# Patient Record
Sex: FEMALE | Race: OTHER
[De-identification: names, ages, dates, MRNs, and addresses within clinical notes are randomized per-mention and may not be internally consistent; named-entity substitution may affect disease eponyms.]

---

## 2017-09-05 ENCOUNTER — HOSPITAL ENCOUNTER (EMERGENCY)
Dept: HOSPITAL 15 - ER | Age: 62
Discharge: HOME | End: 2017-09-05
Payer: COMMERCIAL

## 2017-09-05 VITALS — WEIGHT: 145 LBS | BODY MASS INDEX: 22.76 KG/M2 | HEIGHT: 67 IN

## 2017-09-05 VITALS — SYSTOLIC BLOOD PRESSURE: 156 MMHG | DIASTOLIC BLOOD PRESSURE: 84 MMHG
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
## 2017-09-05 DIAGNOSIS — Z96.641: ICD-10-CM
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
## 2017-09-05 DIAGNOSIS — S73.004A: Primary | ICD-10-CM

## 2017-09-05 DIAGNOSIS — Z90.710: ICD-10-CM

## 2017-09-05 DIAGNOSIS — Z90.49: ICD-10-CM

## 2017-09-05 LAB
ALBUMIN SERPL-MCNC: 3.8 G/DL (ref 3.4–5)
ALP SERPL-CCNC: 85 U/L (ref 45–117)
ANION GAP SERPL CALCULATED.3IONS-SCNC: 11 MMOL/L (ref 5–15)
APTT PPP: 28.9 SEC (ref 22.64–33.71)
BILIRUB SERPL-MCNC: 0.3 MG/DL (ref 0.2–1)
BUN SERPL-MCNC: 11 MG/DL (ref 7–18)
BUN/CREAT SERPL: 14.1
CALCIUM SERPL-MCNC: 9.3 MG/DL (ref 8.5–10.1)
CHLORIDE SERPL-SCNC: 96 MMOL/L (ref 98–107)
CO2 SERPL-SCNC: 26 MMOL/L (ref 21–32)
DEFINITIVE: (no result)
GLUCOSE SERPL-MCNC: 104 MG/DL (ref 74–106)
HCT VFR BLD AUTO: 34.1 % (ref 36–46)
HGB BLD-MCNC: 10.9 G/DL (ref 12.2–16.2)
INR PPP: 0.97 (ref 0.9–1.15)
MCH RBC QN AUTO: 27.7 PG (ref 28–32)
MCV RBC AUTO: 86.7 FL (ref 80–100)
NRBC BLD QL AUTO: 0 %
POTASSIUM SERPL-SCNC: 3.7 MMOL/L (ref 3.5–5.1)
PROT SERPL-MCNC: 7.2 G/DL (ref 6.4–8.2)
PROTHROMBIN TIME: 10.6 SEC (ref 9.37–12.3)
SODIUM SERPL-SCNC: 133 MMOL/L (ref 136–145)

## 2017-09-05 PROCEDURE — 99152 MOD SED SAME PHYS/QHP 5/>YRS: CPT

## 2017-09-05 PROCEDURE — 99285 EMERGENCY DEPT VISIT HI MDM: CPT

## 2017-09-05 PROCEDURE — 96374 THER/PROPH/DIAG INJ IV PUSH: CPT

## 2017-09-05 PROCEDURE — 85610 PROTHROMBIN TIME: CPT

## 2017-09-05 PROCEDURE — 73501 X-RAY EXAM HIP UNI 1 VIEW: CPT

## 2017-09-05 PROCEDURE — 96375 TX/PRO/DX INJ NEW DRUG ADDON: CPT

## 2017-09-05 PROCEDURE — 36415 COLL VENOUS BLD VENIPUNCTURE: CPT

## 2017-09-05 PROCEDURE — 85730 THROMBOPLASTIN TIME PARTIAL: CPT

## 2017-09-05 PROCEDURE — 80053 COMPREHEN METABOLIC PANEL: CPT

## 2017-09-05 PROCEDURE — 85025 COMPLETE CBC W/AUTO DIFF WBC: CPT

## 2017-09-05 PROCEDURE — 27265 TREAT HIP DISLOCATION: CPT

## 2017-09-05 PROCEDURE — 94761 N-INVAS EAR/PLS OXIMETRY MLT: CPT

## 2017-09-05 PROCEDURE — 73502 X-RAY EXAM HIP UNI 2-3 VIEWS: CPT

## 2017-09-05 PROCEDURE — 96361 HYDRATE IV INFUSION ADD-ON: CPT

## 2017-09-09 ENCOUNTER — HOSPITAL ENCOUNTER (EMERGENCY)
Dept: HOSPITAL 15 - ER | Age: 62
Discharge: HOME | End: 2017-09-09
Payer: COMMERCIAL

## 2017-09-09 VITALS — SYSTOLIC BLOOD PRESSURE: 106 MMHG | DIASTOLIC BLOOD PRESSURE: 79 MMHG

## 2017-09-09 VITALS — WEIGHT: 145 LBS | BODY MASS INDEX: 21.98 KG/M2 | HEIGHT: 68 IN

## 2017-09-09 DIAGNOSIS — I10: ICD-10-CM

## 2017-09-09 DIAGNOSIS — G89.29: Primary | ICD-10-CM

## 2017-09-09 DIAGNOSIS — E07.89: ICD-10-CM

## 2017-09-09 DIAGNOSIS — M25.551: ICD-10-CM

## 2017-09-09 DIAGNOSIS — M54.5: ICD-10-CM

## 2017-09-09 DIAGNOSIS — M19.90: ICD-10-CM

## 2017-09-09 PROCEDURE — 73502 X-RAY EXAM HIP UNI 2-3 VIEWS: CPT

## 2017-09-09 PROCEDURE — 96372 THER/PROPH/DIAG INJ SC/IM: CPT

## 2017-09-09 PROCEDURE — 99284 EMERGENCY DEPT VISIT MOD MDM: CPT

## 2017-09-30 ENCOUNTER — HOSPITAL ENCOUNTER (EMERGENCY)
Dept: HOSPITAL 15 - ER | Age: 62
Discharge: HOME | End: 2017-09-30
Payer: COMMERCIAL

## 2017-09-30 VITALS — WEIGHT: 144 LBS | BODY MASS INDEX: 22.6 KG/M2 | HEIGHT: 67 IN

## 2017-09-30 VITALS — SYSTOLIC BLOOD PRESSURE: 144 MMHG | DIASTOLIC BLOOD PRESSURE: 77 MMHG
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
## 2017-09-30 DIAGNOSIS — Z90.710: ICD-10-CM

## 2017-09-30 DIAGNOSIS — Z90.89: ICD-10-CM
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
## 2017-09-30 DIAGNOSIS — S20.219A: ICD-10-CM

## 2017-09-30 DIAGNOSIS — E07.89: ICD-10-CM

## 2017-09-30 DIAGNOSIS — Z88.8: ICD-10-CM

## 2017-09-30 DIAGNOSIS — S76.011A: Primary | ICD-10-CM
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
## 2017-09-30 DIAGNOSIS — G89.29: ICD-10-CM

## 2017-09-30 PROCEDURE — 96372 THER/PROPH/DIAG INJ SC/IM: CPT

## 2017-09-30 PROCEDURE — 71020: CPT

## 2017-09-30 PROCEDURE — 73502 X-RAY EXAM HIP UNI 2-3 VIEWS: CPT

## 2017-09-30 PROCEDURE — 99284 EMERGENCY DEPT VISIT MOD MDM: CPT

## 2017-09-30 PROCEDURE — 73130 X-RAY EXAM OF HAND: CPT

## 2017-12-30 ENCOUNTER — HOSPITAL ENCOUNTER (EMERGENCY)
Dept: HOSPITAL 15 - ER | Age: 62
LOS: 1 days | Discharge: ANOTHER HOSPITAL | End: 2017-12-31
Payer: COMMERCIAL

## 2017-12-30 VITALS — HEIGHT: 63 IN | WEIGHT: 140 LBS | BODY MASS INDEX: 24.8 KG/M2

## 2017-12-30 DIAGNOSIS — E07.89: ICD-10-CM

## 2017-12-30 DIAGNOSIS — M24.451: ICD-10-CM
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
## 2017-12-30 DIAGNOSIS — S73.004A: Primary | ICD-10-CM
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
## 2017-12-30 DIAGNOSIS — Z88.8: ICD-10-CM
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
## 2017-12-30 DIAGNOSIS — Z90.710: ICD-10-CM

## 2017-12-30 DIAGNOSIS — Z90.89: ICD-10-CM

## 2017-12-30 PROCEDURE — 99285 EMERGENCY DEPT VISIT HI MDM: CPT

## 2017-12-30 PROCEDURE — 99152 MOD SED SAME PHYS/QHP 5/>YRS: CPT

## 2017-12-30 PROCEDURE — 96374 THER/PROPH/DIAG INJ IV PUSH: CPT

## 2017-12-30 PROCEDURE — 96376 TX/PRO/DX INJ SAME DRUG ADON: CPT

## 2017-12-30 PROCEDURE — 73501 X-RAY EXAM HIP UNI 1 VIEW: CPT

## 2017-12-30 PROCEDURE — 96375 TX/PRO/DX INJ NEW DRUG ADDON: CPT

## 2017-12-30 PROCEDURE — 27250 TREAT HIP DISLOCATION: CPT

## 2017-12-30 PROCEDURE — 73502 X-RAY EXAM HIP UNI 2-3 VIEWS: CPT

## 2017-12-31 VITALS — DIASTOLIC BLOOD PRESSURE: 47 MMHG | SYSTOLIC BLOOD PRESSURE: 108 MMHG

## 2018-12-19 ENCOUNTER — HOSPITAL ENCOUNTER (OUTPATIENT)
Dept: HOSPITAL 15 - ER | Age: 63
Discharge: ANOTHER HOSPITAL | End: 2018-12-19
Attending: ORTHOPAEDIC SURGERY
Payer: COMMERCIAL

## 2018-12-19 VITALS — BODY MASS INDEX: 25.9 KG/M2 | WEIGHT: 165 LBS | HEIGHT: 67 IN

## 2018-12-19 VITALS — SYSTOLIC BLOOD PRESSURE: 131 MMHG | DIASTOLIC BLOOD PRESSURE: 97 MMHG
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
## 2018-12-19 DIAGNOSIS — I10: ICD-10-CM

## 2018-12-19 DIAGNOSIS — Z96.641: ICD-10-CM
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
## 2018-12-19 DIAGNOSIS — E03.9: ICD-10-CM

## 2018-12-19 DIAGNOSIS — T84.020A: Primary | ICD-10-CM

## 2018-12-19 DIAGNOSIS — M19.90: ICD-10-CM

## 2018-12-19 LAB
ALBUMIN SERPL-MCNC: 3.4 G/DL (ref 3.4–5)
ALP SERPL-CCNC: 83 U/L (ref 45–117)
ALT SERPL-CCNC: 30 U/L (ref 13–56)
ANION GAP SERPL CALCULATED.3IONS-SCNC: 3 MMOL/L (ref 5–15)
BILIRUB SERPL-MCNC: 0.3 MG/DL (ref 0.2–1)
BUN SERPL-MCNC: 13 MG/DL (ref 7–18)
BUN/CREAT SERPL: 14.1
CALCIUM SERPL-MCNC: 8.3 MG/DL (ref 8.5–10.1)
CHLORIDE SERPL-SCNC: 106 MMOL/L (ref 98–107)
CO2 SERPL-SCNC: 27 MMOL/L (ref 21–32)
GLUCOSE SERPL-MCNC: 111 MG/DL (ref 74–106)
HCT VFR BLD AUTO: 36.1 % (ref 36–46)
HGB BLD-MCNC: 11.2 G/DL (ref 12.2–16.2)
MAGNESIUM SERPL-MCNC: 2.7 MG/DL (ref 1.6–2.6)
MCH RBC QN AUTO: 27 PG (ref 28–32)
MCV RBC AUTO: 87.2 FL (ref 80–100)
NRBC BLD QL AUTO: 0.1 %
POTASSIUM SERPL-SCNC: 3.9 MMOL/L (ref 3.5–5.1)
PROT SERPL-MCNC: 6.8 G/DL (ref 6.4–8.2)
SODIUM SERPL-SCNC: 136 MMOL/L (ref 136–145)

## 2018-12-19 PROCEDURE — 93005 ELECTROCARDIOGRAM TRACING: CPT

## 2018-12-19 PROCEDURE — 76000 FLUOROSCOPY <1 HR PHYS/QHP: CPT

## 2018-12-19 PROCEDURE — 27266 TREAT HIP DISLOCATION: CPT

## 2018-12-19 PROCEDURE — 73501 X-RAY EXAM HIP UNI 1 VIEW: CPT

## 2018-12-19 PROCEDURE — 80053 COMPREHEN METABOLIC PANEL: CPT

## 2018-12-19 PROCEDURE — 83735 ASSAY OF MAGNESIUM: CPT

## 2018-12-19 PROCEDURE — 71045 X-RAY EXAM CHEST 1 VIEW: CPT

## 2018-12-19 PROCEDURE — 36415 COLL VENOUS BLD VENIPUNCTURE: CPT

## 2018-12-19 PROCEDURE — 85025 COMPLETE CBC W/AUTO DIFF WBC: CPT

## 2018-12-19 RX ADMIN — HYDROMORPHONE HYDROCHLORIDE PRN MG: 2 INJECTION, SOLUTION INTRAMUSCULAR; INTRAVENOUS; SUBCUTANEOUS at 15:15

## 2018-12-19 RX ADMIN — HYDROMORPHONE HYDROCHLORIDE PRN MG: 2 INJECTION, SOLUTION INTRAMUSCULAR; INTRAVENOUS; SUBCUTANEOUS at 15:01

## 2018-12-19 RX ADMIN — HYDROMORPHONE HYDROCHLORIDE PRN MG: 2 INJECTION, SOLUTION INTRAMUSCULAR; INTRAVENOUS; SUBCUTANEOUS at 14:50

## 2018-12-19 RX ADMIN — HYDROMORPHONE HYDROCHLORIDE PRN MG: 2 INJECTION, SOLUTION INTRAMUSCULAR; INTRAVENOUS; SUBCUTANEOUS at 14:34

## 2020-05-29 ENCOUNTER — HOSPITAL ENCOUNTER (INPATIENT)
Dept: HOSPITAL 4 - SMU | Age: 65
LOS: 1 days | Discharge: HOME | DRG: 458 | End: 2020-05-30
Attending: NEUROLOGICAL SURGERY | Admitting: NEUROLOGICAL SURGERY
Payer: COMMERCIAL

## 2020-05-29 VITALS — SYSTOLIC BLOOD PRESSURE: 106 MMHG

## 2020-05-29 VITALS — SYSTOLIC BLOOD PRESSURE: 150 MMHG

## 2020-05-29 VITALS — WEIGHT: 167 LBS | HEIGHT: 67 IN | BODY MASS INDEX: 26.21 KG/M2

## 2020-05-29 DIAGNOSIS — Z03.818: ICD-10-CM

## 2020-05-29 DIAGNOSIS — M51.16: Primary | ICD-10-CM

## 2020-05-29 DIAGNOSIS — M41.50: ICD-10-CM

## 2020-05-29 DIAGNOSIS — G89.29: ICD-10-CM

## 2020-05-29 PROCEDURE — C9399 UNCLASSIFIED DRUGS OR BIOLOG: HCPCS

## 2020-05-29 PROCEDURE — 0ST40ZZ RESECTION OF LUMBOSACRAL DISC, OPEN APPROACH: ICD-10-PCS | Performed by: NEUROLOGICAL SURGERY

## 2020-05-29 PROCEDURE — 4A11X4G MONITORING OF PERIPHERAL NERVOUS ELECTRICAL ACTIVITY, INTRAOPERATIVE, EXTERNAL APPROACH: ICD-10-PCS | Performed by: NEUROLOGICAL SURGERY

## 2020-05-29 PROCEDURE — 01NB0ZZ RELEASE LUMBAR NERVE, OPEN APPROACH: ICD-10-PCS | Performed by: NEUROLOGICAL SURGERY

## 2020-05-29 PROCEDURE — 0ST20ZZ RESECTION OF LUMBAR VERTEBRAL DISC, OPEN APPROACH: ICD-10-PCS | Performed by: NEUROLOGICAL SURGERY

## 2020-05-29 PROCEDURE — 0SG10A0 FUSION OF 2 OR MORE LUMBAR VERTEBRAL JOINTS WITH INTERBODY FUSION DEVICE, ANTERIOR APPROACH, ANTERIOR COLUMN, OPEN APPROACH: ICD-10-PCS | Performed by: NEUROLOGICAL SURGERY

## 2020-05-29 PROCEDURE — 0SG30A0 FUSION OF LUMBOSACRAL JOINT WITH INTERBODY FUSION DEVICE, ANTERIOR APPROACH, ANTERIOR COLUMN, OPEN APPROACH: ICD-10-PCS | Performed by: NEUROLOGICAL SURGERY

## 2020-05-29 RX ADMIN — HYDROMORPHONE HYDROCHLORIDE PRN MG: 2 INJECTION INTRAMUSCULAR; INTRAVENOUS; SUBCUTANEOUS at 19:00

## 2020-05-29 RX ADMIN — SODIUM CHLORIDE SCH MLS/HR: 9 INJECTION, SOLUTION INTRAVENOUS at 22:40

## 2020-05-29 RX ADMIN — CEFAZOLIN SODIUM SCH MLS/HR: 2 SOLUTION INTRAVENOUS at 22:35

## 2020-05-29 RX ADMIN — HYDROMORPHONE HYDROCHLORIDE PRN MG: 2 INJECTION INTRAMUSCULAR; INTRAVENOUS; SUBCUTANEOUS at 16:01

## 2020-05-29 RX ADMIN — HYDROMORPHONE HYDROCHLORIDE PRN MG: 2 INJECTION INTRAMUSCULAR; INTRAVENOUS; SUBCUTANEOUS at 22:18

## 2020-05-29 RX ADMIN — SODIUM CHLORIDE SCH: 9 INJECTION, SOLUTION INTRAVENOUS at 13:25

## 2020-05-29 RX ADMIN — DOCUSATE SODIUM SCH MG: 100 CAPSULE, LIQUID FILLED ORAL at 21:00

## 2020-05-29 NOTE — NUR
Received patient from OR, she is awake and alert and in stable condition. She was given 
dilaudid and fentanyl in the OR. She is not in any kind of distress or pain at this time. I 
got report from OR nurse. Bed is low, locked, 2 side rails up and call light is within 
reach. Ciarra RHODES

## 2020-05-29 NOTE — NUR
Closing notes: Patient is stable and doing well, ambulates with a steady gait and without 
assistance. Her L hand IV infiltrated, it was removed and a new one started on left arm 
#22g, currently infusing NS. She is breathing well on RA. She presents no new complaints and 
ask for Dilaudid every 3 hrs for pain control. I will endorse to night shift nurse.  Bed is 
low, locked, 2 side rails up and call light is within reach. Ciarra RHODES

## 2020-05-29 NOTE — NUR
Patient is doing well, is able to ambulate without assistance and she has a steady gait. She 
is not in any distress and presents no new complaints. Bed is low, locked, 2 side rails up 
and call light is within reach. Ciarra RHODES

## 2020-05-29 NOTE — NUR
IV restart



IV came out from R. AC, catheter tip intact, no bleeding noted.  IV restarted R. FA 22G via 
aseptic technique, flushes well w/ NS.  Pt tolerated well.  Pt medicated w/ Dilaudid 2mg IVP 
as needed for pain.  To monitor.

## 2020-05-29 NOTE — NUR
Opening notes



Pt AAOx4, ambulated in the hallways.  VSS, afebrile.  No s/s distress noted.  Abd dressing 
C/D/I.  Call light within reach.  Safety maintained.  To monitor.

## 2020-05-30 VITALS — SYSTOLIC BLOOD PRESSURE: 99 MMHG

## 2020-05-30 VITALS — SYSTOLIC BLOOD PRESSURE: 119 MMHG

## 2020-05-30 VITALS — SYSTOLIC BLOOD PRESSURE: 120 MMHG

## 2020-05-30 LAB
ANION GAP SERPL CALCULATED.3IONS-SCNC: 15 MMOL/L (ref 5–15)
BASOPHILS # BLD AUTO: 0 K/UL (ref 0–0.2)
BASOPHILS NFR BLD AUTO: 0.2 % (ref 0–2)
BUN SERPL-MCNC: 18 MG/DL (ref 8–21)
CALCIUM SERPL-MCNC: 8.6 MG/DL (ref 8.4–11)
CHLORIDE SERPL-SCNC: 94 MMOL/L (ref 98–107)
CREAT SERPL-MCNC: 1.75 MG/DL (ref 0.55–1.3)
EOSINOPHIL # BLD AUTO: 0 K/UL (ref 0–0.4)
EOSINOPHIL NFR BLD AUTO: 0 % (ref 0–4)
ERYTHROCYTE [DISTWIDTH] IN BLOOD BY AUTOMATED COUNT: 18 % (ref 9–15)
GFR SERPL CREATININE-BSD FRML MDRD: 38 ML/MIN (ref 90–?)
GLUCOSE SERPL-MCNC: 130 MG/DL (ref 70–99)
HCT VFR BLD AUTO: 30.3 % (ref 36–48)
HGB BLD-MCNC: 9.7 G/DL (ref 12–16)
LYMPHOCYTES # BLD AUTO: 1.4 K/UL (ref 1–5.5)
LYMPHOCYTES NFR BLD AUTO: 13.2 % (ref 20.5–51.5)
MCH RBC QN AUTO: 28 PG (ref 27–31)
MCHC RBC AUTO-ENTMCNC: 32 % (ref 32–36)
MCV RBC AUTO: 86 FL (ref 79–98)
MONOCYTES # BLD MANUAL: 0.7 K/UL (ref 0–1)
MONOCYTES # BLD MANUAL: 6.9 % (ref 1.7–9.3)
NEUTROPHILS # BLD AUTO: 8.6 K/UL (ref 1.8–7.7)
NEUTROPHILS NFR BLD AUTO: 79.7 % (ref 40–70)
PLATELET # BLD AUTO: 340 K/UL (ref 130–430)
POTASSIUM SERPL-SCNC: 4.7 MMOL/L (ref 3.5–5.1)
RBC # BLD AUTO: 3.54 MIL/UL (ref 4.2–6.2)
SODIUM SERPLBLD-SCNC: 128 MMOL/L (ref 136–145)
WBC # BLD AUTO: 10.8 K/UL (ref 4.8–10.8)

## 2020-05-30 RX ADMIN — HYDROMORPHONE HYDROCHLORIDE PRN MG: 2 INJECTION INTRAMUSCULAR; INTRAVENOUS; SUBCUTANEOUS at 02:01

## 2020-05-30 RX ADMIN — CEFAZOLIN SODIUM SCH MLS/HR: 2 SOLUTION INTRAVENOUS at 05:22

## 2020-05-30 RX ADMIN — HYDROMORPHONE HYDROCHLORIDE PRN MG: 2 INJECTION INTRAMUSCULAR; INTRAVENOUS; SUBCUTANEOUS at 05:23

## 2020-05-30 RX ADMIN — HYDROMORPHONE HYDROCHLORIDE PRN MG: 2 INJECTION INTRAMUSCULAR; INTRAVENOUS; SUBCUTANEOUS at 08:17

## 2020-05-30 RX ADMIN — DOCUSATE SODIUM SCH MG: 100 CAPSULE, LIQUID FILLED ORAL at 08:21

## 2020-05-30 NOTE — NUR
Discharge home, ambulate with steady gait. Pain is controlled at this time. Discharge 
instruction given and discussed with patient. pt verbalize and understanding. arm band and 
ivl removed. Will monitor.

-------------------------------------------------------------------------------

Addendum: 05/30/20 at 1011 by Maritza Rodgers RN

-------------------------------------------------------------------------------

discharge

## 2020-05-30 NOTE — NUR
Closing notes/Pain 



Pt c/o severe pain, medicated with Dilaudid 2mg IVP.  Abd dressing C/D/I.  IVF/Antibiotic 
infusing at ordered rate no s/s infiltration.  CAll light within reach.  Safety maintained.  
To monitor.

## 2020-05-30 NOTE — NUR
Case mgt: Rec'd order for lumbar sacral brace--I called nurse Pablo to have house 
supervisor check materials department for brace-call me if there isn't one available in our 
stock supply. ER might have one also. MADELYN RHODES

## 2020-05-31 ENCOUNTER — HOSPITAL ENCOUNTER (INPATIENT)
Dept: HOSPITAL 4 - SED | Age: 65
LOS: 2 days | Discharge: HOME | DRG: 641 | End: 2020-06-02
Attending: NEUROLOGICAL SURGERY | Admitting: NEUROLOGICAL SURGERY
Payer: COMMERCIAL

## 2020-05-31 VITALS — SYSTOLIC BLOOD PRESSURE: 147 MMHG

## 2020-05-31 VITALS — SYSTOLIC BLOOD PRESSURE: 125 MMHG

## 2020-05-31 VITALS — WEIGHT: 179.12 LBS | BODY MASS INDEX: 29.84 KG/M2 | HEIGHT: 65 IN

## 2020-05-31 DIAGNOSIS — F41.9: ICD-10-CM

## 2020-05-31 DIAGNOSIS — I10: ICD-10-CM

## 2020-05-31 DIAGNOSIS — E03.9: ICD-10-CM

## 2020-05-31 DIAGNOSIS — E86.0: Primary | ICD-10-CM

## 2020-05-31 DIAGNOSIS — Z79.899: ICD-10-CM

## 2020-05-31 DIAGNOSIS — Z98.1: ICD-10-CM

## 2020-05-31 LAB
ALBUMIN SERPL BCP-MCNC: 3.2 G/DL (ref 3.4–4.8)
ALT SERPL W P-5'-P-CCNC: 28 U/L (ref 12–78)
AMPHETAMINES UR QL SCN: NEGATIVE
AMYLASE SERPL-CCNC: 15 U/L (ref 0–100)
ANION GAP SERPL CALCULATED.3IONS-SCNC: 13 MMOL/L (ref 5–15)
APPEARANCE UR: CLEAR
AST SERPL W P-5'-P-CCNC: 57 U/L (ref 10–37)
BACTERIA URNS QL MICRO: (no result) /HPF
BARBITURATES UR QL SCN: NEGATIVE
BASOPHILS # BLD AUTO: 0 K/UL (ref 0–0.2)
BASOPHILS NFR BLD AUTO: 0.1 % (ref 0–2)
BENZODIAZ UR QL SCN: NEGATIVE
BILIRUB SERPL-MCNC: 0.4 MG/DL (ref 0–1)
BILIRUB UR QL STRIP: NEGATIVE
BUN SERPL-MCNC: 21 MG/DL (ref 8–21)
BZE UR QL SCN: NEGATIVE
CALCIUM SERPL-MCNC: 8.8 MG/DL (ref 8.4–11)
CANNABINOIDS UR QL SCN: NEGATIVE
CHLORIDE SERPL-SCNC: 101 MMOL/L (ref 98–107)
CK MB SERPL-CCNC: 23.5 NG/ML (ref 0–3.6)
CK MB SERPL-RTO: 2.9 % (ref 0–2.9)
COLOR UR: YELLOW
CREAT SERPL-MCNC: 1.74 MG/DL (ref 0.55–1.3)
EOSINOPHIL # BLD AUTO: 0 K/UL (ref 0–0.4)
EOSINOPHIL NFR BLD AUTO: 0.1 % (ref 0–4)
ERYTHROCYTE [DISTWIDTH] IN BLOOD BY AUTOMATED COUNT: 18 % (ref 9–15)
GFR SERPL CREATININE-BSD FRML MDRD: 38 ML/MIN (ref 90–?)
GLUCOSE SERPL-MCNC: 125 MG/DL (ref 70–99)
GLUCOSE UR STRIP-MCNC: NEGATIVE MG/DL
HCT VFR BLD AUTO: 27.6 % (ref 36–48)
HGB BLD-MCNC: 8.9 G/DL (ref 12–16)
HGB UR QL STRIP: (no result)
INR PPP: 1 (ref 0.8–1.2)
KETONES UR STRIP-MCNC: (no result) MG/DL
LEUKOCYTE ESTERASE UR QL STRIP: NEGATIVE
LIPASE SERPL-CCNC: 15 U/L (ref 73–393)
LYMPHOCYTES # BLD AUTO: 0.6 K/UL (ref 1–5.5)
LYMPHOCYTES NFR BLD AUTO: 5.2 % (ref 20.5–51.5)
MCH RBC QN AUTO: 28 PG (ref 27–31)
MCHC RBC AUTO-ENTMCNC: 32 % (ref 32–36)
MCV RBC AUTO: 85 FL (ref 79–98)
METHADONE UR-SCNC: NEGATIVE UMOL/L
METHAMPHET UR-SCNC: NEGATIVE UMOL/L
MONOCYTES # BLD MANUAL: 0.8 K/UL (ref 0–1)
MONOCYTES # BLD MANUAL: 7 % (ref 1.7–9.3)
NEUTROPHILS # BLD AUTO: 10.1 K/UL (ref 1.8–7.7)
NEUTROPHILS NFR BLD AUTO: 87.6 % (ref 40–70)
NITRITE UR QL STRIP: NEGATIVE
OPIATES UR QL SCN: POSITIVE
OXYCODONE SERPL-MCNC: POSITIVE NG/ML
PCP UR QL SCN: NEGATIVE
PH UR STRIP: 5.5 [PH] (ref 5–8)
PLATELET # BLD AUTO: 240 K/UL (ref 130–430)
POTASSIUM SERPL-SCNC: 3.9 MMOL/L (ref 3.5–5.1)
PROT UR QL STRIP: NEGATIVE
PROTHROMBIN TIME: 10.1 SECS (ref 9.5–12.5)
RBC # BLD AUTO: 3.23 MIL/UL (ref 4.2–6.2)
RBC #/AREA URNS HPF: (no result) /HPF (ref 0–3)
SODIUM SERPLBLD-SCNC: 136 MMOL/L (ref 136–145)
SP GR UR STRIP: 1.01 (ref 1–1.03)
TRICYCLICS UR-MCNC: NEGATIVE NG/ML
URINE PROPOXYPHENE SCREEN: NEGATIVE
UROBILINOGEN UR STRIP-MCNC: 0.2 MG/DL (ref 0.2–1)
WBC # BLD AUTO: 11.5 K/UL (ref 4.8–10.8)
WBC #/AREA URNS HPF: (no result) /HPF (ref 0–3)

## 2020-05-31 RX ADMIN — HYDROMORPHONE HYDROCHLORIDE PRN MG: 2 INJECTION INTRAMUSCULAR; INTRAVENOUS; SUBCUTANEOUS at 22:44

## 2020-05-31 RX ADMIN — HYDROMORPHONE HYDROCHLORIDE PRN MG: 2 INJECTION INTRAMUSCULAR; INTRAVENOUS; SUBCUTANEOUS at 18:58

## 2020-05-31 NOTE — NUR
pt.assessed.pt.presents quiescent affect;calm,somnolent.iv access intact;patent iv fluids 
infusing.

per flacc;pain mgx;pt.absent facial grimaces/body posturing.

## 2020-05-31 NOTE — NUR
Patient will be admitted to care of [facility].  Admitted to M/S unit.  Will go 
to room 105B.  Belongings list completed.  Complete and up to date summary 
report printed. SBAR report to be given at bedside with opportunity for 
questions.

## 2020-05-31 NOTE — NUR
PATIENT UP TO COMMODE FOR URINATION; PATIENT IS CRYING OUT AND IS NOT ABLE TO 
TELL WHAT HER SYMPTOMS ARE;  INDICATED HE NOTED HER DISORIENTED STATE 
AND SUSPECTS IT IS SECONDARY TO PAIN MEDICATION USAGE; PATIENT IS ABLE TO 
TRANSFER FROM BED TO CHAIR BUT IS OTHERWISE UNABLE TO AMBULATE

## 2020-05-31 NOTE — NUR
i have administered dilaudid;2mg ivp;via the peripheral iv access;location;rt.foot.iv access 
intact;patent;blood return extant.

to re-assess the pain medication efficacy per pain mgx protocol.

## 2020-05-31 NOTE — NUR
Legal Hold     Referral: Legal Hold Court     Intervention: Pt presented for legal hold meeting with  to discuss legal options of contesting hold and meeting with the court doctors tomorrow afternoon, or not contesting legal hold and continuing the hold for one week.  advised that pt's legal hold will be be continued for one week (2/28).       Plan: Pt's legal hold has been continued for one week. Pt awaiting transfer to an in patient psych facility.           REASSESSMENT; PATIENT RETURNED FROM XRAY, SEDATE AND DENIES PAIN; (HALDOL, 
ATIVAN, BENADRYL HELD PER ERMD) INCONTINENT OF URINE; LINEN CHANGE AND CHUX

## 2020-05-31 NOTE — NUR
pt.received via the er-dept.atilio has provided/conveyed the pt's report/data.pt.presented 
to sdch from home intractable pain ;back.

pt.is s/p surgery;back x2 days.per . is the assigned attending 
md;josefina.for the pt.pt.presents iv access location;rt.foot;intact;patent.iv fluids;lr 
infusing.pt.c/o pain:to review the emar med-list;re;pain medication.general cutaneous 
integrity intact.general status stable.respiratory status stable;o2-sat%=98%@room air.call 
light/telephone demonstration  

presented to the pt.

return demonstration satisfactory.per the pt.

## 2020-06-01 VITALS — SYSTOLIC BLOOD PRESSURE: 144 MMHG

## 2020-06-01 VITALS — SYSTOLIC BLOOD PRESSURE: 155 MMHG

## 2020-06-01 VITALS — SYSTOLIC BLOOD PRESSURE: 122 MMHG

## 2020-06-01 VITALS — SYSTOLIC BLOOD PRESSURE: 143 MMHG

## 2020-06-01 VITALS — SYSTOLIC BLOOD PRESSURE: 140 MMHG

## 2020-06-01 RX ADMIN — HYDROMORPHONE HYDROCHLORIDE PRN MG: 2 INJECTION INTRAMUSCULAR; INTRAVENOUS; SUBCUTANEOUS at 01:59

## 2020-06-01 RX ADMIN — SODIUM CHLORIDE, SODIUM LACTATE, POTASSIUM CHLORIDE, AND CALCIUM CHLORIDE SCH MLS/HR: 600; 310; 30; 20 INJECTION, SOLUTION INTRAVENOUS at 21:00

## 2020-06-01 RX ADMIN — HYDROMORPHONE HYDROCHLORIDE PRN MG: 2 INJECTION INTRAMUSCULAR; INTRAVENOUS; SUBCUTANEOUS at 05:00

## 2020-06-01 RX ADMIN — HYDROCODONE BITARTRATE AND ACETAMINOPHEN PRN TAB: 10; 325 TABLET ORAL at 11:34

## 2020-06-01 RX ADMIN — HYDROCODONE BITARTRATE AND ACETAMINOPHEN PRN TAB: 10; 325 TABLET ORAL at 20:11

## 2020-06-01 NOTE — NUR
PATIENT IS IN BED RESTING. PATIENT IS A&O X2-3 AND IS IMPROVING NEUROLOGICALLY COMPARED TO 
THIS MORNING. PATIENT HAS SPOKEN TO FAMILY THROUGHOUT THE ENTIRE DAY. PATIENT DENIES PAIN, 
DISCOMFORT, AND SHORTNESS OF BREATH. CALL LIGHT IN REACH. BED IN LOW POSITION. WILL GIVE 
REPORT TO NIGHT NURSE.

## 2020-06-01 NOTE — NUR
Nutrition Update



Elvis Scale 18 noted.

Pt admitted for Abdominal Pain

Diet: Regular

BMI: 29.8 kg/m2

RD to follow per nutrition care standards.

## 2020-06-01 NOTE — NUR
c/o Pain

reporting severe abdominal pain and Norco (2tab) given for severe pain as ordered, educated 
on side effects; verbalized understanding. Bed alarm on and call light w/in reach.

## 2020-06-01 NOTE — NUR
PATIENT IS IN BED LYING DOWN. PATIENT IS A&O X1 TO PERSON. SHE REPEATEDLY SAYS THANK YOU AND 
CALLS OUT FOR HER MOTHER. SPOKE WITH  BY PHONE AND HE STATED SHE IS DOING BETTER THAN 
SHE HAS BEEN.  SPOKE WITH PATIENT BY PHONE. PATIENT DENIES PAIN. PATIENT DOES NOT 
DISPLAYS SIGNS OF SHORTNESS OF BREATH. CALL LIGHT IN REACH. BED IN LOW POSITION. WILL 
CONTINUE TO MONITOR PATIENT THROUGHOUT SHIFT.

## 2020-06-01 NOTE — NUR
pt.assessed.v/s assessed;values w/in normal limits.no c/o pain,nausea.i have provided the 
pt.water.iv access intact;patent 

iv fluids infusing.

pt.repositioned.general status stable.respiratory status stable:o2-sat%=98%.call 
light/telephone placed w/in reach of the pt.

## 2020-06-01 NOTE — NUR
PAGED DOCTOR TO GET ORDER FOR PO PAIN MEDICATION. DR. NI ORDERED NORCO 10 1 TAB FOR 
MODERATE PAIN AND 2 TAB FOR SEVERE PAIN. PATIENT IS UNABLE TO FEED SELF. WILL CONTINUE TO 
MONITOR. BED IN LOW POSITION. CALL LIGHT IN REACH.

## 2020-06-01 NOTE — NUR
pt.assessed.pt.assessed for cleanliness.pt.repositioned.iv fluids lr x1 dose completed.iv 
access:location rt.foot;intact;patent 

iv lock status.

i have medicated the pt.pain:dilaudid;2mg ivp.general status stable.respiratory status 
stable.call light/telephone placed w/in 

reach of the pt.

## 2020-06-01 NOTE — NUR
Dr. Pepe Cantu informed patient received PO Norco 2 tabs () for severe pain and it did 
not help. Patient does not have IV site. He gave orders for one time diluadid 2mg IM, read 
back order.

## 2020-06-01 NOTE — NUR
pt.assessed.pt.presents quiescent affect;calm,somnolent.iv fluids access intact;patent iv 
fluids infusing.

general status stable.

respiratory status stable.unlabored.pt.capable to reposition self.call light/telephone w/in  
reach of the pt.

## 2020-06-01 NOTE — NUR
pt.assessed.pt.presents requested medication;pain.i have administered diludid:2mg ivp.to 
re-assess the pain medication 

efficacy per pain mgx protocol.iv access intact/patent iv fluids infusing.i have provided 
water.call light/telephone placed 

w/in reach of the pt.pt.repositioned.

## 2020-06-02 VITALS — SYSTOLIC BLOOD PRESSURE: 137 MMHG

## 2020-06-02 VITALS — SYSTOLIC BLOOD PRESSURE: 148 MMHG

## 2020-06-02 VITALS — SYSTOLIC BLOOD PRESSURE: 146 MMHG

## 2020-06-02 VITALS — SYSTOLIC BLOOD PRESSURE: 152 MMHG

## 2020-06-02 RX ADMIN — HYDROCODONE BITARTRATE AND ACETAMINOPHEN PRN TAB: 10; 325 TABLET ORAL at 12:24

## 2020-06-02 RX ADMIN — SODIUM CHLORIDE, SODIUM LACTATE, POTASSIUM CHLORIDE, AND CALCIUM CHLORIDE SCH MLS/HR: 600; 310; 30; 20 INJECTION, SOLUTION INTRAVENOUS at 07:00

## 2020-06-02 RX ADMIN — HYDROCODONE BITARTRATE AND ACETAMINOPHEN PRN TAB: 10; 325 TABLET ORAL at 05:29

## 2020-06-02 NOTE — NUR
PATIENT A&O X4. PATIENT DISCHARGED HOME WITH HER . NO SIGNS AND SYMTPOMS OF DISTRESS 
OR SHORTNESS OF BREATH.

## 2020-06-02 NOTE — NUR
ambulating / closing note

Patient is ambulating in hallway, steady gait, No IV site. No s/sx of distress. She reports 
pain is present, it never goes away. Will endorse to incoming nurse.

## 2021-03-26 ENCOUNTER — HOSPITAL ENCOUNTER (INPATIENT)
Dept: HOSPITAL 4 - SDS | Age: 66
LOS: 2 days | Discharge: HOME | DRG: 460 | End: 2021-03-28
Attending: NEUROLOGICAL SURGERY | Admitting: NEUROLOGICAL SURGERY
Payer: COMMERCIAL

## 2021-03-26 VITALS — HEIGHT: 67 IN | BODY MASS INDEX: 23.86 KG/M2 | WEIGHT: 152 LBS

## 2021-03-26 VITALS — SYSTOLIC BLOOD PRESSURE: 108 MMHG

## 2021-03-26 VITALS — SYSTOLIC BLOOD PRESSURE: 139 MMHG

## 2021-03-26 VITALS — SYSTOLIC BLOOD PRESSURE: 137 MMHG

## 2021-03-26 VITALS — SYSTOLIC BLOOD PRESSURE: 115 MMHG

## 2021-03-26 DIAGNOSIS — M46.1: Primary | ICD-10-CM

## 2021-03-26 PROCEDURE — 4A11X4G MONITORING OF PERIPHERAL NERVOUS ELECTRICAL ACTIVITY, INTRAOPERATIVE, EXTERNAL APPROACH: ICD-10-PCS | Performed by: NEUROLOGICAL SURGERY

## 2021-03-26 PROCEDURE — 0SG80JZ FUSION OF LEFT SACROILIAC JOINT WITH SYNTHETIC SUBSTITUTE, OPEN APPROACH: ICD-10-PCS | Performed by: NEUROLOGICAL SURGERY

## 2021-03-26 RX ADMIN — HYDROMORPHONE HYDROCHLORIDE PRN MG: 2 INJECTION INTRAMUSCULAR; INTRAVENOUS; SUBCUTANEOUS at 18:36

## 2021-03-26 RX ADMIN — HYDROMORPHONE HYDROCHLORIDE PRN MG: 2 INJECTION INTRAMUSCULAR; INTRAVENOUS; SUBCUTANEOUS at 22:37

## 2021-03-26 RX ADMIN — FENTANYL CITRATE PRN MCG: 50 INJECTION, SOLUTION INTRAMUSCULAR; INTRAVENOUS at 16:08

## 2021-03-26 RX ADMIN — FENTANYL CITRATE PRN MCG: 50 INJECTION, SOLUTION INTRAMUSCULAR; INTRAVENOUS at 16:10

## 2021-03-26 RX ADMIN — DOCUSATE SODIUM SCH MG: 100 CAPSULE, LIQUID FILLED ORAL at 20:52

## 2021-03-27 VITALS — SYSTOLIC BLOOD PRESSURE: 114 MMHG

## 2021-03-27 VITALS — SYSTOLIC BLOOD PRESSURE: 116 MMHG

## 2021-03-27 VITALS — SYSTOLIC BLOOD PRESSURE: 106 MMHG

## 2021-03-27 VITALS — SYSTOLIC BLOOD PRESSURE: 104 MMHG

## 2021-03-27 VITALS — SYSTOLIC BLOOD PRESSURE: 129 MMHG

## 2021-03-27 RX ADMIN — HYDROMORPHONE HYDROCHLORIDE PRN MG: 2 INJECTION INTRAMUSCULAR; INTRAVENOUS; SUBCUTANEOUS at 11:37

## 2021-03-27 RX ADMIN — DOCUSATE SODIUM SCH MG: 100 CAPSULE, LIQUID FILLED ORAL at 08:35

## 2021-03-27 RX ADMIN — HYDROMORPHONE HYDROCHLORIDE PRN MG: 2 INJECTION INTRAMUSCULAR; INTRAVENOUS; SUBCUTANEOUS at 02:34

## 2021-03-27 RX ADMIN — DOCUSATE SODIUM SCH MG: 100 CAPSULE, LIQUID FILLED ORAL at 21:11

## 2021-03-27 RX ADMIN — HYDROMORPHONE HYDROCHLORIDE PRN MG: 2 INJECTION INTRAMUSCULAR; INTRAVENOUS; SUBCUTANEOUS at 18:23

## 2021-03-27 RX ADMIN — HYDROMORPHONE HYDROCHLORIDE PRN MG: 2 INJECTION INTRAMUSCULAR; INTRAVENOUS; SUBCUTANEOUS at 15:11

## 2021-03-27 RX ADMIN — HYDROMORPHONE HYDROCHLORIDE PRN MG: 2 INJECTION INTRAMUSCULAR; INTRAVENOUS; SUBCUTANEOUS at 05:34

## 2021-03-27 RX ADMIN — HYDROMORPHONE HYDROCHLORIDE PRN MG: 2 INJECTION INTRAMUSCULAR; INTRAVENOUS; SUBCUTANEOUS at 21:32

## 2021-03-27 RX ADMIN — HYDROMORPHONE HYDROCHLORIDE PRN MG: 2 INJECTION INTRAMUSCULAR; INTRAVENOUS; SUBCUTANEOUS at 08:41

## 2021-03-28 VITALS — SYSTOLIC BLOOD PRESSURE: 105 MMHG

## 2021-03-28 VITALS — SYSTOLIC BLOOD PRESSURE: 111 MMHG

## 2021-03-28 VITALS — SYSTOLIC BLOOD PRESSURE: 120 MMHG

## 2021-03-28 VITALS — SYSTOLIC BLOOD PRESSURE: 112 MMHG

## 2021-03-28 RX ADMIN — HYDROMORPHONE HYDROCHLORIDE PRN MG: 2 INJECTION INTRAMUSCULAR; INTRAVENOUS; SUBCUTANEOUS at 10:07

## 2021-03-28 RX ADMIN — HYDROMORPHONE HYDROCHLORIDE PRN MG: 2 INJECTION INTRAMUSCULAR; INTRAVENOUS; SUBCUTANEOUS at 07:00

## 2021-03-28 RX ADMIN — HYDROMORPHONE HYDROCHLORIDE PRN MG: 2 INJECTION INTRAMUSCULAR; INTRAVENOUS; SUBCUTANEOUS at 00:48

## 2021-03-28 RX ADMIN — HYDROMORPHONE HYDROCHLORIDE PRN MG: 2 INJECTION INTRAMUSCULAR; INTRAVENOUS; SUBCUTANEOUS at 13:22

## 2021-03-28 RX ADMIN — HYDROCODONE BITARTRATE AND ACETAMINOPHEN PRN TAB: 10; 325 TABLET ORAL at 15:59

## 2021-03-28 RX ADMIN — DOCUSATE SODIUM SCH MG: 100 CAPSULE, LIQUID FILLED ORAL at 09:59

## 2021-03-28 RX ADMIN — HYDROCODONE BITARTRATE AND ACETAMINOPHEN PRN TAB: 10; 325 TABLET ORAL at 04:52
